# Patient Record
Sex: MALE | Race: WHITE | NOT HISPANIC OR LATINO | URBAN - METROPOLITAN AREA
[De-identification: names, ages, dates, MRNs, and addresses within clinical notes are randomized per-mention and may not be internally consistent; named-entity substitution may affect disease eponyms.]

---
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
## 2021-08-17 ENCOUNTER — OFFICE VISIT - RIVER FALLS (OUTPATIENT)
Dept: FAMILY MEDICINE | Facility: CLINIC | Age: 21
End: 2021-08-17

## 2022-02-12 VITALS
HEART RATE: 62 BPM | HEIGHT: 70 IN | BODY MASS INDEX: 26.11 KG/M2 | SYSTOLIC BLOOD PRESSURE: 122 MMHG | DIASTOLIC BLOOD PRESSURE: 64 MMHG | TEMPERATURE: 98.1 F | WEIGHT: 182.4 LBS

## 2022-02-12 VITALS
HEIGHT: 70 IN | SYSTOLIC BLOOD PRESSURE: 122 MMHG | OXYGEN SATURATION: 97 % | OXYGEN SATURATION: 98 % | WEIGHT: 173 LBS | HEART RATE: 77 BPM | HEART RATE: 95 BPM | BODY MASS INDEX: 24.77 KG/M2 | WEIGHT: 173 LBS | DIASTOLIC BLOOD PRESSURE: 60 MMHG | TEMPERATURE: 97.5 F | BODY MASS INDEX: 24.77 KG/M2 | HEIGHT: 70 IN

## 2022-02-12 VITALS
HEART RATE: 60 BPM | BODY MASS INDEX: 24.97 KG/M2 | DIASTOLIC BLOOD PRESSURE: 62 MMHG | WEIGHT: 174 LBS | SYSTOLIC BLOOD PRESSURE: 112 MMHG

## 2022-02-12 VITALS
OXYGEN SATURATION: 97 % | DIASTOLIC BLOOD PRESSURE: 78 MMHG | HEIGHT: 70 IN | SYSTOLIC BLOOD PRESSURE: 128 MMHG | HEART RATE: 75 BPM | WEIGHT: 173.4 LBS | BODY MASS INDEX: 24.82 KG/M2

## 2022-02-16 NOTE — PROGRESS NOTES
Patient:   JENNIFER RAMOS            MRN: [id number]            FIN: [id number]               Age:   18 years     Sex:  Male     :  2000   Associated Diagnoses:   Sports physical   Author:   Mode Shin MD      Impression and Plan   Diagnosis     Sports physical (TDF18-SQ Z02.5).     Plan:  No restrictions or special recommendations for school athletic participation.    Orders     Orders   Charges (Evaluation and Management):  93570 periodic preventive med est patient 12-17yrs (Charge) (Order): Quantity: 1, Sports physical.        Visit Information      Date of Service: 10/15/2018 03:58 pm  Performing Location: [facility]  Encounter#: [id number]      Primary Care Provider (PCP):  Mode Shin MD    NPI# [id number]      Referring Provider:  Mode Shin MD# [id number]   Visit type:  Annual exam.    Accompanied by:  No one.    Source of history:  Self.    History limitation:  None.       Chief Complaint   Chief complaint discussed and confirmed correct.     10/15/2018 4:06 PM CDT   Pt here for sports px and wart tx        History of Present Illness   See History form filled out by parent (scanned to EMR)      Review of Systems   Constitutional:  Negative.    Eye:  Negative.    Ear/Nose/Mouth/Throat:  Negative.    Respiratory:  Negative.    Cardiovascular:  Negative.    Gastrointestinal:  Negative.    Genitourinary:  Negative.    Hematology/Lymphatics:  Negative.    Endocrine:  Negative.    Immunologic:  Negative.    Musculoskeletal:  Negative.    Integumentary:  Negative.    Neurologic:  Negative.    Psychiatric:  Negative.           All other systems reviewed and negative      Health Status   Allergies:    Allergic Reactions (Selected)  Moderate  Cefuroxime Axetil (Rash)  Severity Not Documented  Ibuprofen (Hives,sob)  Penicillin (No reactions were documented)   Medications:  (Selected)      Problem list:    No problem items selected or recorded.      Histories   Past Medical  History:    No active or resolved past medical history items have been selected or recorded.   Family History:    Cancer  Grandfather (M)  Hypertension  Grandmother (P)  Father     Procedure history:    No active procedure history items have been selected or recorded.   Social History:        Alcohol Assessment: Denies Alcohol Use      Tobacco Assessment: Denies Tobacco Use      Exercise and Physical Activity Assessment: Regular exercise        Physical Examination   Vital signs reviewed  and within acceptable limits    Vital Signs   10/15/2018 4:06 PM CDT Peripheral Pulse Rate 75 bpm    Systolic Blood Pressure 128 mmHg    Diastolic Blood Pressure 78 mmHg    Mean Arterial Pressure 95 mmHg    BP Site Left arm    Oxygen Saturation 97 %      Measurements from flowsheet : Measurements   10/15/2018 4:06 PM CDT Height Measured - Standard 70 in    Weight Measured - Standard 173.4 lb    BSA 1.97 m2    Body Mass Index 24.88 kg/m2    Body Mass Index Percentile 84.67      General:  Alert and oriented, No acute distress.    Eye:  Pupils are equal, round and reactive to light, Extraocular movements are intact, Normal conjunctiva.    HENT:  Normocephalic, Tympanic membranes are clear, Normal hearing, Oral mucosa is moist, No pharyngeal erythema.    Neck:  Supple, Non-tender, No carotid bruit, No jugular venous distention, No lymphadenopathy, No thyromegaly.    Respiratory:  Lungs are clear to auscultation, Respirations are non-labored, Breath sounds are equal, Symmetrical chest wall expansion, No chest wall tenderness.    Cardiovascular:  Normal rate, Regular rhythm, No murmur, No gallop, Good pulses equal in all extremities, Normal peripheral perfusion, No edema.    Gastrointestinal:  Soft, Non-tender, Non-distended, Normal bowel sounds, No organomegaly.    Lymphatics:  No lymphadenopathy neck, axilla, groin.    Musculoskeletal:  Normal range of motion, Normal strength, No tenderness, No swelling, No deformity, Normal gait.     Integumentary:  Warm, Dry, Pink.    Neurologic:  Normal sensory, Normal motor function, No focal deficits, Cranial Nerves II-XII are grossly intact, Normal deep tendon reflexes.    Cognition and Speech:  Oriented, Speech clear and coherent, Functional cognition intact.    Psychiatric:  Cooperative, Appropriate mood & affect, Normal judgment.

## 2022-02-16 NOTE — PROGRESS NOTES
Patient:   JENNIFER RAMOS            MRN: [id number]            FIN: [id number]               Age:   18 years     Sex:  Male     :  2000   Associated Diagnoses:   Common wart   Author:   Mode Shin MD      Impression and Plan   Diagnosis     Common wart (XNT40-JU B07.8).     Orders     Orders (Selected)   Outpatient Orders  Completed  50004 destruction benign lesions up to 14 (Charge): Quantity: 1, Common wart.        Procedure   Dermatology Surgical Procedure   Date/ Time:  10/15/2018 4:35:00 PM.     Confirmed: patient, procedure, side, and site are correct, safety procedures followed.     Informed consent: signed by patient.     Performed by: ALVARADO Logan.     Indication: remove lesion.     Procedure performed: cryosurgery.     Cryosurgery: site: left middle finger, liquid nitrogen applied, for  30  seconds, 6 mm diameter.     Complications: none.     Procedure tolerated: well.     follow up in two weeks for retreatment if not resolved.     wound care instructions given.

## 2022-02-16 NOTE — PROGRESS NOTES
Patient:   JENNIFER RAMOS            MRN: [id number]            FIN: [id number]               Age:   20 years     Sex:  Male     :  2000   Associated Diagnoses:   Synovial cyst of shoulder   Author:   Mode Dumont PA-C      Chief Complaint   2021 10:53 AM CDT   CYST, TOP OF LEFT SHOULDER, 1ST NOTICED YESTERDAY MORNING, SORE PAINFUL TO TOUCH,        History of Present Illness   Chief complaint and symptoms noted above and confirmed with patient   2 day hx of cyst on left shoulder,  painful, no drainage  he had labrum repair of this shoulder last year      Review of Systems   Constitutional:  Negative.    Ear/Nose/Mouth/Throat:  Negative.    Respiratory:  Negative.    Cardiovascular:  Negative.       Health Status   Allergies:    Allergic Reactions (Selected)  Moderate  Cefuroxime Axetil (Rash)  Severity Not Documented  Ibuprofen (Hives,sob)  Penicillin (No reactions were documented)   Medications:    Medications          No medications documented     Problem list:    All Problems (Selected)  Tear of left glenoid labrum / SNOMED CT 4164949520 / Confirmed      Histories   Past Medical History:    Resolved  Dental extraction ([id number]):  Resolved in 2019 at 19 years.  Comments:  2020 CST 3:52 PM CST - Kip CHAU, Mode  Third Molars   Family History:    Cancer  Grandfather (M)  Hypertension  Grandmother (P)  Father     Procedure history:    No active procedure history items have been selected or recorded.   Social History:        Electronic Cigarette/Vaping Assessment            Electronic Cigarette Use: Never.      Alcohol Assessment: Denies Alcohol Use      Tobacco Assessment: Denies Tobacco Use            Never (less than 100 in lifetime)      Substance Abuse Assessment: Denies Substance Abuse      Employment and Education Assessment            Student      Home and Environment Assessment            Marital status: Single.  Lives with Family.  Living situation: Home/Independent.       Exercise and Physical Activity Assessment: Regular exercise        Physical Examination   Vital Signs   8/17/2021 10:53 AM CDT Peripheral Pulse Rate 60 bpm    Pulse Site Radial artery    HR Method Manual    Systolic Blood Pressure 112 mmHg    Diastolic Blood Pressure 62 mmHg    Mean Arterial Pressure 79 mmHg    BP Site Right arm    BP Method Manual      Measurements from flowsheet : Measurements   8/17/2021 10:53 AM CDT Weight Measured - Standard 174 lb    Weight Percentile 99.99    Weight Z-score 3.81      General:  No acute distress.    Integumentary:  tender deep cyst on left shoulder at AC joint, does not affect left shoulder ROM.       Impression and Plan   Diagnosis     Synovial cyst of shoulder (HXN51-WG M71.319).     Summary:  will refer to ortho for further evaluation, use tylenol, ice/heat.    Orders     Orders   Requests (Consults / Referrals):  Referral (Request) (Order): 8/17/2021 11:16 AM CDT, Referred to: Orthopaedics, Priority: Soon, Synovial cyst of shoulder.     Orders   Charges (Evaluation and Management):  19968 office o/p est low 20-29 min (Charge) (Order): Quantity: 1, Synovial cyst of shoulder.

## 2022-02-16 NOTE — NURSING NOTE
Comprehensive Intake Entered On:  2/26/2020 4:07 PM CST    Performed On:  2/26/2020 4:03 PM CST by Adriana Riddle CMA               Summary   Chief Complaint :   c/o  red painful lump in left  arm pit   Menstrual Status :   N/A   Weight Measured :   182.4 lb(Converted to: 182 lb 6 oz, 82.74 kg)    Height Measured :   70 in(Converted to: 5 ft 10 in, 177.80 cm)    Body Mass Index :   26.17 kg/m2   Body Surface Area :   2.02 m2   Systolic Blood Pressure :   122 mmHg   Diastolic Blood Pressure :   64 mmHg   Mean Arterial Pressure :   83 mmHg   Peripheral Pulse Rate :   62 bpm   Temperature Tympanic :   98.1 DegF(Converted to: 36.7 DegC)    Adriana Riddle CMA - 2/26/2020 4:03 PM CST   Health Status   Allergies Verified? :   Yes   Medication History Verified? :   Yes   Immunizations Current :   No   Pre-Visit Planning Status :   Completed   Tobacco Use? :   Never smoker   Adriana Riddle CMA - 2/26/2020 4:03 PM CST   Consents   Consent for Immunization Exchange :   Consent Granted   Consent for Immunizations to Providers :   Consent Granted   Adriana Riddle CMA - 2/26/2020 4:03 PM CST   Meds / Allergies   (As Of: 2/26/2020 4:07:48 PM CST)   Allergies (Active)   Cefuroxime Axetil  Estimated Onset Date:   <not entered> 9/1/2011 ; Reactions:   rash ; Created By:   Mode Shin MD; Reaction Status:   Active ; Category:   Drug ; Substance:   Cefuroxime Axetil ; Type:   Allergy ; Severity:   Moderate ; Updated By:   Mode Shin MD; Reviewed Date:   2/6/2020 10:38 AM CST      ibuprofen  Estimated Onset Date:   Unspecified ; Reactions:   hives,SOB ; Created By:   Henny Asher CMA; Reaction Status:   Active ; Category:   Drug ; Substance:   ibuprofen ; Type:   Allergy ; Updated By:   Henny Asher CMA; Source:   Parent ; Reviewed Date:   2/6/2020 10:38 AM CST      penicillin  Estimated Onset Date:   Unspecified ; Created By:   Mode Shin MD; Reaction Status:   Active ; Category:   Drug ;  Substance:   penicillin ; Type:   Allergy ; Updated By:   Mode Shin MD; Reviewed Date:   2/6/2020 10:38 AM CST        Medication List   (As Of: 2/26/2020 4:07:48 PM CST)   Prescription/Discharge Order    oseltamivir  :   oseltamivir ; Status:   Processing ; Ordered As Mnemonic:   Tamiflu 75 mg oral capsule ; Ordering Provider:   Mode Shin MD; Action Display:   Complete ; Catalog Code:   oseltamivir ; Order Dt/Tm:   2/26/2020 4:05:34 PM CST

## 2022-02-16 NOTE — TELEPHONE ENCOUNTER
---------------------  From: Adriana Neal   To: Amaury Ferguson PA-C;     Sent: 7/20/2020 12:46:55 PM CDT  Subject: General Message     Father called- Pily had called him and he had called back while you guys were on break   Can give him a call back at [phone]---------------------  From: Amaury Ferguson PA-C   To: Pily Simmons CMA;     Sent: 7/20/2020 1:04:53 PM CDT  Subject: FW: General Message---------------------  From: Amaury Ferguson PA-C   To: Adriana Neal;     Sent: 7/20/2020 3:09:59 PM CDT  Subject: RE: General Message     Pily reached Jacob. He is going to [place] to be seen.    KAH

## 2022-02-16 NOTE — PROGRESS NOTES
Patient:   JENNIFER RAMOS            MRN: [id number]            FIN: [id number]               Age:   19 years     Sex:  Male     :  2000   Associated Diagnoses:   Influenza   Author:   Mode Shin MD      Impression and Plan   Diagnosis     Influenza (QSR47-QA J11.1).     Course:  Worsening.    Orders     Orders   Charges (Evaluation and Management):  55045 office outpatient visit 15 minutes (Charge) (Order): Quantity: 1, Influenza.     Orders (Selected)   Prescriptions  Prescribed  Tamiflu 75 mg oral capsule: = 1 cap(s) ( 75 mg ), PO, BID, # 10 cap(s), 0 Refill(s), Type: Maintenance, Pharmacy: [facility], 1 cap(s) Oral bid,x5 day(s).        Visit Information      Date of Service: 2020 10:36 am  Performing Location: [facility]  Encounter#: [id number]      Primary Care Provider (PCP):  Mode Shin MD    NPI# [id number]   Visit type:  New symptom.    Accompanied by:  No one.    Source of history:  Self.    History limitation:  None.       Chief Complaint   Chief complaint discussed and confirmed correct.     2020 10:38 AM CST    Pt here for fever and plugged ears        History of Present Illness             The patient presents with symptoms of an upper respiratory infection.  The symptoms of the upper respiratory infection are described as rhinorrhea, sore throat and nasal congestion.  The severity of the symptoms associated to the upper respiratory infection is severe.  The timing/course of upper respiratory infection symptoms is constant.  The symptoms of upper respiratory infection have lasted for 1 day(s).  The context of the upper respiratory infection symptoms: occurred in association with illness.  Exacerbating factors consist of none.  Relieving factors consist of medication, rest and fluids.  Associated symptoms consist of chills, ear pain, fatigue, fever, headache, sore throat, denies difficulty swallowing and denies nausea.        Review of Systems    Constitutional:  Fever, Weakness, Fatigue, Decreased activity.    Eye:  Negative.    Ear/Nose/Mouth/Throat:  Nasal congestion, Sore throat.    Respiratory:  Cough.    Cardiovascular:  Negative.    Gastrointestinal:  Negative.    Genitourinary:  Negative.    Hematology/Lymphatics:  Negative.    Endocrine:  Negative.    Immunologic:  Negative.    Musculoskeletal:  Joint pain, Muscle pain.    Integumentary:  Negative.    Neurologic:  Negative.    Psychiatric:  Negative.    All other systems reviewed and negative      Health Status   Allergies:    Allergic Reactions (Selected)  Moderate  Cefuroxime Axetil (Rash)  Severity Not Documented  Ibuprofen (Hives,sob)  Penicillin (No reactions were documented)   Medications:  (Selected)   Prescriptions  Prescribed  Tamiflu 75 mg oral capsule: = 1 cap(s) ( 75 mg ), PO, BID, # 10 cap(s), 0 Refill(s), Type: Maintenance, Pharmacy: [facility], 1 cap(s) Oral bid,x5 day(s)   Problem list:    All Problems  Tear of left glenoid labrum / SNOMED CT [id number] / Confirmed  Resolved: Dental extraction / SNOMED CT [id number]      Histories   Past Medical History:    Resolved  Dental extraction ([id number]):  Resolved in 2019 at 19 years.  Comments:  1/6/2020 CST 3:52 PM CST - Kip CHAU, Mode  Third Molars   Family History:    Cancer  Grandfather (M)  Hypertension  Grandmother (P)  Father     Procedure history:    No active procedure history items have been selected or recorded.   Social History:        Alcohol Assessment: Denies Alcohol Use      Tobacco Assessment: Denies Tobacco Use      Substance Abuse Assessment: Denies Substance Abuse      Employment and Education Assessment            Student      Home and Environment Assessment            Marital status: Single.  Lives with Family.  Living situation: Home/Independent.      Exercise and Physical Activity Assessment: Regular exercise        Physical Examination   Vital signs reviewed  and within  acceptable limits    Vital Signs   2/6/2020 10:38 AM CST Temperature Tympanic 97.5 DegF  LOW    Peripheral Pulse Rate 95 bpm    Oxygen Saturation 98 %      Measurements from flowsheet : Measurements   2/6/2020 10:38 AM CST Height Measured - Standard 70 in    Weight Measured - Standard 173 lb    BSA 1.97 m2    Body Mass Index 24.82 kg/m2    Body Mass Index Percentile 78.89      General:  Moderate distress.    Eye:  Pupils are equal, round and reactive to light, Extraocular movements are intact, Normal conjunctiva.    HENT:  Normocephalic, Tympanic membranes are clear, Normal hearing, Oral mucosa is moist.         Nose: Both nostrils, Discharge ( Moderate amount, Clear ), congested.         Throat: Pharynx ( Posterior, Erythematous ).    Neck:  Supple, Non-tender, No lymphadenopathy.    Respiratory:  Lungs are clear to auscultation, Respirations are non-labored, Breath sounds are equal, demonstrates frequent loose cough.    Cardiovascular:  Normal rate, Regular rhythm, No murmur, Normal peripheral perfusion, No edema.    Integumentary:  Warm, Dry, Pink.    Psychiatric:  Cooperative, Appropriate mood & affect, Normal judgment.

## 2022-02-16 NOTE — NURSING NOTE
Comprehensive Intake Entered On:  1/6/2020 3:44 PM CST    Performed On:  1/6/2020 3:42 PM CST by Iqra Wolf CMA               Summary   Chief Complaint :   Pt here for pre op DOS 1/15/20 at [facility] with Dr Barrios for left labral tear   Menstrual Status :   N/A   Weight Measured :   173 lb(Converted to: 173 lb 0 oz, 78.47 kg)    Height Measured :   70 in(Converted to: 5 ft 10 in, 177.80 cm)    Body Mass Index :   24.82 kg/m2   Body Surface Area :   1.97 m2   Systolic Blood Pressure :   122 mmHg   Diastolic Blood Pressure :   60 mmHg   Mean Arterial Pressure :   81 mmHg   Peripheral Pulse Rate :   77 bpm   Oxygen Saturation :   97 %   Iqra Wolf CMA - 1/6/2020 3:42 PM CST   Health Status   Allergies Verified? :   Yes   Medication History Verified? :   Yes   Immunizations Current :   No   Medical History Verified? :   Yes   Pre-Visit Planning Status :   Completed   Tobacco Use? :   Never smoker   Iqra Wolf CMA - 1/6/2020 3:42 PM CST   Consents   Consent for Immunization Exchange :   Consent Granted   Consent for Immunizations to Providers :   Consent Granted   Iqra Wolf CMA - 1/6/2020 3:42 PM CST   Meds / Allergies   (As Of: 1/6/2020 3:44:59 PM CST)   Allergies (Active)   Cefuroxime Axetil  Estimated Onset Date:   <not entered> 9/1/2011 ; Reactions:   rash ; Created By:   Mode Shin MD; Reaction Status:   Active ; Category:   Drug ; Substance:   Cefuroxime Axetil ; Type:   Allergy ; Severity:   Moderate ; Updated By:   Mode Shin MD; Reviewed Date:   1/6/2020 3:44 PM CST      ibuprofen  Estimated Onset Date:   Unspecified ; Reactions:   hives,SOB ; Created By:   Henny Asher CMA; Reaction Status:   Active ; Category:   Drug ; Substance:   ibuprofen ; Type:   Allergy ; Updated By:   Henny Asher CMA; Source:   Parent ; Reviewed Date:   1/6/2020 3:44 PM CST      penicillin  Estimated Onset Date:   Unspecified ; Created By:   Mode Shin MD; Reaction Status:   Active ;  Category:   Drug ; Substance:   penicillin ; Type:   Allergy ; Updated By:   Mode Shin MD; Reviewed Date:   1/6/2020 3:44 PM CST        Medication List   (As Of: 1/6/2020 3:44:59 PM CST)   No Known Home Medications     Iqra Wolf CMA - 1/6/2020 3:44:49 PM

## 2022-02-16 NOTE — NURSING NOTE
Comprehensive Intake Entered On:  8/17/2021 10:58 AM CDT    Performed On:  8/17/2021 10:53 AM CDT by Trino Bravo LPN               Summary   Chief Complaint :   CYST, TOP OF LEFT SHOULDER, 1ST NOTICED YESTERDAY MORNING, SORE PAINFUL TO TOUCH,    Menstrual Status :   N/A   Weight Measured :   174 lb(Converted to: 174 lb 0 oz, 78.925 kg)    Systolic Blood Pressure :   112 mmHg   Diastolic Blood Pressure :   62 mmHg   Mean Arterial Pressure :   79 mmHg   Peripheral Pulse Rate :   60 bpm   BP Site :   Right arm   Pulse Site :   Radial artery   BP Method :   Manual   HR Method :   Manual   Trino Brvao LPN - 8/17/2021 10:53 AM CDT   Consents   Consent for Immunization Exchange :   Consent Granted   Consent for Immunizations to Providers :   Consent Granted   Trino Bravo LPN - 8/17/2021 10:53 AM CDT   Meds / Allergies   (As Of: 8/17/2021 10:58:35 AM CDT)   Allergies (Active)   Cefuroxime Axetil  Estimated Onset Date:   <not entered> 9/1/2011 ; Reactions:   rash ; Created By:   Mode Shin MD; Reaction Status:   Active ; Category:   Drug ; Substance:   Cefuroxime Axetil ; Type:   Allergy ; Severity:   Moderate ; Updated By:   Mode Shin MD; Reviewed Date:   8/17/2021 10:56 AM CDT      ibuprofen  Estimated Onset Date:   Unspecified ; Reactions:   hives,SOB ; Created By:   Henny Asher CMA; Reaction Status:   Active ; Category:   Drug ; Substance:   ibuprofen ; Type:   Allergy ; Updated By:   Henny Asher CMA; Source:   Parent ; Reviewed Date:   8/17/2021 10:56 AM CDT      penicillin  Estimated Onset Date:   Unspecified ; Created By:   Mode Shin MD; Reaction Status:   Active ; Category:   Drug ; Substance:   penicillin ; Type:   Allergy ; Updated By:   Mode Shin MD; Reviewed Date:   8/17/2021 10:56 AM CDT        Medication List   (As Of: 8/17/2021 10:58:35 AM CDT)   Prescription/Discharge Order    sulfamethoxazole-trimethoprim  :   sulfamethoxazole-trimethoprim ; Status:   Processing  ; Ordered As Mnemonic:   sulfamethoxazole-trimethoprim 800 mg-160 mg oral tablet ; Ordering Provider:   John Borges MD; Action Display:   Complete ; Catalog Code:   sulfamethoxazole-trimethoprim ; Order Dt/Tm:   8/17/2021 10:53:32 AM CDT            Social History   Social History   (As Of: 8/17/2021 10:58:35 AM CDT)   Alcohol:  Denies Alcohol Use      (Last Updated: 6/29/2010 11:23:40 AM CDT by Miladis Campos LPN )         Tobacco:  Denies Tobacco Use      Never (less than 100 in lifetime)   (Last Updated: 8/17/2021 10:53:17 AM CDT by Trino Bravo LPN)          Electronic Cigarette/Vaping:        Electronic Cigarette Use: Never.   (Last Updated: 8/17/2021 10:53:20 AM CDT by Trino Bravo LPN)          Substance Abuse:  Denies Substance Abuse      (Last Updated: 1/6/2020 3:52:39 PM CST by Mode Shin MD )         Employment/School:        Student   (Last Updated: 1/6/2020 3:52:46 PM CST by Mode Shin MD)          Home/Environment:        Marital status: Single.  Lives with Family.  Living situation: Home/Independent.   (Last Updated: 1/6/2020 3:52:58 PM CST by Mode Shin MD)          Exercise:  Regular exercise      (Last Updated: 6/29/2010 11:23:45 AM CDT by Miladis Campos LPN )

## 2022-02-16 NOTE — PROGRESS NOTES
Patient:   JENNIFER RAMOS            MRN: [id number]            FIN: [id number]               Age:   19 years     Sex:  Male     :  2000   Associated Diagnoses:   Tear of left glenoid labrum   Author:   Mode Shin MD      Impression and Plan   Diagnosis     Tear of left glenoid labrum ([id number] S43.432D).     Condition:  Stable, no contraindication for general anesthesia or surgical procedure..    Orders     Orders   Charges (Evaluation and Management):  [id number] office outpatient visit 25 minutes (Charge) (Order): Quantity: 1, Tear of left glenoid labrum.        Preoperative Information   Indication for surgery:  Musculoskeletal disorder.         Associated symptoms: Symptomatic tear of the left glenoid labrum not improving with conservative treatment.    Accompanied by:  No one.    Source of history:  Self.    History limitation:  None.       Chief Complaint   Chief complaint discussed and confirmed correct.     2020 3:42 PM CST     Pt here for pre op DOS 1/15/20 at [facility] with Dr Barrios for left labral tear        Review of Systems   Constitutional:  Negative.    Eye:  Negative.    Ear/Nose/Mouth/Throat:  Negative.    Respiratory:  Negative.    Cardiovascular:  Negative.    Gastrointestinal:  Negative.    Genitourinary:  Negative.    Hematology/Lymphatics:  Negative.    Endocrine:  Negative.    Immunologic:  Negative.    Musculoskeletal:  Negative.    Integumentary:  Negative.    Neurologic:  Negative.    Psychiatric:  Negative.    All other systems reviewed and negative   No personal or family history of anesthesia reactions  No history of bleeding or blood clotting disorders  No history of heart murmur or need for prophylactic antibiotics  No history of blood transfusion  No history of recent infectious contacts       Health Status   Allergies:    Allergic Reactions (Selected)  Moderate  Cefuroxime Axetil (Rash)  Severity Not Documented  Ibuprofen (Hives,sob)  Penicillin (No reactions were  documented)   Medications:  (Selected)      Problem list:    All Problems  Tear of left glenoid labrum / SNOMED CT [id number] / Confirmed  Resolved: Dental extraction / SNOMED CT [id number]      Histories   Past Medical History:    Resolved  Dental extraction ([id number]):  Resolved in 2019 at 19 years.  Comments:  1/6/2020 CST 3:52 PM CST - Kip CHAU, Mode  Third Molars   Family History:    Cancer  Grandfather (M)  Hypertension  Grandmother (P)  Father     Procedure history:    No active procedure history items have been selected or recorded.   Social History:        Alcohol Assessment: Denies Alcohol Use      Tobacco Assessment: Denies Tobacco Use      Substance Abuse Assessment: Denies Substance Abuse      Employment and Education Assessment            Student      Home and Environment Assessment            Marital status: Single.  Lives with Family.  Living situation: Home/Independent.      Exercise and Physical Activity Assessment: Regular exercise        Physical Examination   Vital signs reviewed  and within acceptable limits    Vital Signs   1/6/2020 3:42 PM CST Peripheral Pulse Rate 77 bpm    Systolic Blood Pressure 122 mmHg    Diastolic Blood Pressure 60 mmHg    Mean Arterial Pressure 81 mmHg    Oxygen Saturation 97 %      Measurements from flowsheet : Measurements   1/6/2020 3:42 PM CST Height Measured - Standard 70 in    Weight Measured - Standard 173 lb    BSA 1.97 m2    Body Mass Index 24.82 kg/m2    Body Mass Index Percentile 79.29      General:  Alert and oriented, No acute distress.    Eye:  Pupils are equal, round and reactive to light, Extraocular movements are intact, Normal conjunctiva.    HENT:  Normocephalic, Tympanic membranes are clear, Normal hearing, Oral mucosa is moist, No pharyngeal erythema.    Neck:  Supple, Non-tender, No carotid bruit, No jugular venous distention, No lymphadenopathy, No thyromegaly.    Respiratory:  Lungs are clear to auscultation,  Respirations are non-labored, Breath sounds are equal, Symmetrical chest wall expansion, No chest wall tenderness.    Cardiovascular:  Normal rate, Regular rhythm, No murmur, No gallop, Good pulses equal in all extremities, Normal peripheral perfusion, No edema.    Gastrointestinal:  Soft, Non-tender, Non-distended, Normal bowel sounds, No organomegaly.    Lymphatics:  No lymphadenopathy neck, axilla, groin.    Musculoskeletal:  Normal range of motion, Normal strength, No tenderness, No swelling, No deformity, Normal gait.    Integumentary:  Warm, Dry, Pink.    Neurologic:  Normal sensory, Normal motor function, No focal deficits, Cranial Nerves II-XII are grossly intact, Normal deep tendon reflexes.    Psychiatric:  Cooperative, Appropriate mood & affect, Normal judgment.

## 2022-02-16 NOTE — PROGRESS NOTES
Patient:   JENNIFER RAMOS            MRN: [id number]            FIN: [id number]               Age:   19 years     Sex:  Male     :  2000   Associated Diagnoses:   Adenopathy   Author:   John Borges MD      Visit Information      Date of Service: 2020 04:00 pm  Performing Location: [facility]  Encounter#: [id number]      Primary Care Provider (PCP):  Mode Shin MD    NPI# [id number]      Referring Provider:  John Borges MD    NPI# [id number]      Chief Complaint   2020 4:03 PM CST    c/o  red painful lump in left  arm pit        Subjective   Chief complaint 2020 4:03 PM CST    c/o  red painful lump in left  arm pit  .     did have slap surgery on left shoulder several weeks ago  no fever, no chills, no tingling      Health Status   Allergies:    Allergic Reactions (Selected)  Moderate  Cefuroxime Axetil (Rash)  Severity Not Documented  Ibuprofen (Hives,sob)  Penicillin (No reactions were documented)   Medications:  (Selected)   Prescriptions  Prescribed  sulfamethoxazole-trimethoprim 800 mg-160 mg oral tablet: 1 tab(s), PO, BID, # 20 tab(s), 0 Refill(s), Type: Maintenance, Pharmacy: [facility], 1 tab(s) Oral bid,x10 day(s),    Medications          *denotes recorded medication          sulfamethoxazole-trimethoprim 800 mg-160 mg oral tablet: 1 tab(s), PO, BID, for 10 day(s), 20 tab(s), 0 Refill(s).       Problem list:    All Problems (Selected)  Tear of left glenoid labrum / [id number] / Confirmed      Objective   Vital Signs   2020 4:03 PM CST Temperature Tympanic 98.1 DegF    Peripheral Pulse Rate 62 bpm    Systolic Blood Pressure 122 mmHg    Diastolic Blood Pressure 64 mmHg    Mean Arterial Pressure 83 mmHg      Measurements from flowsheet : Measurements   2020 4:03 PM CST Height Measured - Standard 70 in    Weight Measured - Standard 182.4 lb    BSA 2.02 m2    Body Mass Index 26.17 kg/m2    Body Mass Index Percentile 86.35      no signs of wound  redness  small lymph nodes in left axillae  skin is OK      Impression and Plan   Assessment and Plan:          Diagnosis: Adenopathy (HBL25-AY R59.1).         Course: surgery may have contributed but I do not think their is serous underlying infection.    Orders      (Selected)   Prescriptions  Prescribed  sulfamethoxazole-trimethoprim 800 mg-160 mg oral tablet: 1 tab(s), PO, BID, # 20 tab(s), 0 Refill(s), Type: Maintenance, Pharmacy: [facility], 1 tab(s) Oral bid,x10 day(s).     Reviewed expected course, what to watch for and when to return..     .

## 2022-02-16 NOTE — NURSING NOTE
Comprehensive Intake Entered On:  2/6/2020 10:39 AM CST    Performed On:  2/6/2020 10:38 AM CST by Iqra Wolf CMA               Summary   Chief Complaint :   Pt here for fever and plugged ears   Menstrual Status :   N/A   Weight Measured :   173 lb(Converted to: 173 lb 0 oz, 78.47 kg)    Height Measured :   70 in(Converted to: 5 ft 10 in, 177.80 cm)    Body Mass Index :   24.82 kg/m2   Body Surface Area :   1.97 m2   Peripheral Pulse Rate :   95 bpm   Temperature Tympanic :   97.5 DegF(Converted to: 36.4 DegC)  (LOW)    Oxygen Saturation :   98 %   Iqra Wolf CMA - 2/6/2020 10:38 AM CST   Health Status   Allergies Verified? :   Yes   Medication History Verified? :   Yes   Immunizations Current :   No   Medical History Verified? :   Yes   Pre-Visit Planning Status :   Completed   Tobacco Use? :   Never smoker   Iqra Wolf CMA - 2/6/2020 10:38 AM CST   Consents   Consent for Immunization Exchange :   Consent Granted   Consent for Immunizations to Providers :   Consent Granted   Iqra Wolf CMA - 2/6/2020 10:38 AM CST   Meds / Allergies   (As Of: 2/6/2020 10:39:45 AM CST)   Allergies (Active)   Cefuroxime Axetil  Estimated Onset Date:   <not entered> 9/1/2011 ; Reactions:   rash ; Created By:   Mode Shin MD; Reaction Status:   Active ; Category:   Drug ; Substance:   Cefuroxime Axetil ; Type:   Allergy ; Severity:   Moderate ; Updated By:   Mode Shin MD; Reviewed Date:   2/6/2020 10:38 AM CST      ibuprofen  Estimated Onset Date:   Unspecified ; Reactions:   hives,SOB ; Created By:   Henny Asher CMA; Reaction Status:   Active ; Category:   Drug ; Substance:   ibuprofen ; Type:   Allergy ; Updated By:   Henny Asher CMA; Source:   Parent ; Reviewed Date:   2/6/2020 10:38 AM CST      penicillin  Estimated Onset Date:   Unspecified ; Created By:   Mode Shin MD; Reaction Status:   Active ; Category:   Drug ; Substance:   penicillin ; Type:   Allergy ; Updated By:   Kip  Mode CHAU; Reviewed Date:   2/6/2020 10:38 AM CST        Medication List   (As Of: 2/6/2020 10:39:45 AM CST)   No Known Home Medications     Iqra Wolf CMA - 2/6/2020 10:38:36 AM